# Patient Record
Sex: MALE | Race: WHITE | NOT HISPANIC OR LATINO | Employment: FULL TIME | URBAN - METROPOLITAN AREA
[De-identification: names, ages, dates, MRNs, and addresses within clinical notes are randomized per-mention and may not be internally consistent; named-entity substitution may affect disease eponyms.]

---

## 2019-06-13 ENCOUNTER — WORRISOME GROWTH - SEE NOTE (OUTPATIENT)
Dept: URBAN - METROPOLITAN AREA CLINIC 31 | Facility: CLINIC | Age: 54
Setting detail: DERMATOLOGY
End: 2019-06-13

## 2019-06-13 DIAGNOSIS — D22.5 MELANOCYTIC NEVI OF TRUNK: ICD-10-CM

## 2019-06-13 DIAGNOSIS — L73.2 HIDRADENITIS SUPPURATIVA: ICD-10-CM

## 2019-06-13 DIAGNOSIS — S40.261A INSECT BITE (NONVENOMOUS) OF RIGHT SHOULDER, INITIAL ENCOUNTER: ICD-10-CM

## 2019-06-13 DIAGNOSIS — L56.8 OTHER SPECIFIED ACUTE SKIN CHANGES DUE TO ULTRAVIOLET RADIATION: ICD-10-CM

## 2019-06-13 PROBLEM — L57.0 ACTINIC KERATOSIS: Status: RESOLVED | Noted: 2019-06-13

## 2019-06-13 PROCEDURE — 17000 DESTRUCT PREMALG LESION: CPT

## 2020-02-05 ENCOUNTER — OFFICE VISIT (OUTPATIENT)
Dept: FAMILY MEDICINE CLINIC | Facility: CLINIC | Age: 55
End: 2020-02-05

## 2020-02-05 VITALS
OXYGEN SATURATION: 98 % | WEIGHT: 200 LBS | BODY MASS INDEX: 27.09 KG/M2 | TEMPERATURE: 97.7 F | HEIGHT: 72 IN | HEART RATE: 67 BPM | SYSTOLIC BLOOD PRESSURE: 118 MMHG | DIASTOLIC BLOOD PRESSURE: 78 MMHG

## 2020-02-05 DIAGNOSIS — R04.0 EPISTAXIS: Primary | ICD-10-CM

## 2020-02-05 PROCEDURE — 99213 OFFICE O/P EST LOW 20 MIN: CPT | Performed by: FAMILY MEDICINE

## 2020-02-05 NOTE — PROGRESS NOTES
Subjective   Benjamin Enciso is a 54 y.o. male.     Chief Complaint   Patient presents with   • Nose Bleed     1x time only, today       Patient is known to me.  He was seen at my former office.  He is here today for a new problem.  He had a nosebleed today out of the blue.  He stated that he thought his nose was running and he looked down and it was bleeding.  It was coming only from the right nostril.  There was no pain in the nasal area and the patient denies any recent upper respiratory infection or headaches.  He did have a blood pressure check at work about 10 days ago and it was about 120/80.  Otherwise in general the patient feels that his health has been good lately.         Review of Systems   Constitutional: Negative for activity change, chills, fatigue and fever.   HENT: Negative for hearing loss, swollen glands, tinnitus and trouble swallowing.    Eyes: Negative for pain and visual disturbance.   Respiratory: Negative for cough and shortness of breath.    Cardiovascular: Negative for chest pain, palpitations and leg swelling.   Gastrointestinal: Negative for diarrhea and nausea.   Endocrine: Negative for polydipsia and polyuria.   Genitourinary: Negative for difficulty urinating and urinary incontinence.   Musculoskeletal: Negative for arthralgias, gait problem and joint swelling.   Skin: Negative for rash.   Allergic/Immunologic: Negative for immunocompromised state.   Neurological: Negative for dizziness, light-headedness and headache.   Hematological: Negative for adenopathy. Does not bruise/bleed easily.   Psychiatric/Behavioral: Negative for dysphoric mood and sleep disturbance.       The following portions of the patient's history were reviewed and updated as appropriate: allergies, current medications, past family history, past medical history, past social history, past surgical history and problem list.    History reviewed. No pertinent past medical history.    History reviewed. No pertinent  "surgical history.    Family History   Problem Relation Age of Onset   • Cancer Mother    • No Known Problems Father        Social History     Socioeconomic History   • Marital status:      Spouse name: Not on file   • Number of children: Not on file   • Years of education: Not on file   • Highest education level: Not on file   Tobacco Use   • Smoking status: Never Smoker   • Smokeless tobacco: Never Used   Substance and Sexual Activity   • Alcohol use: Yes     Comment: rarely   • Drug use: Never       No current outpatient medications on file.    Objective     Vitals:    02/05/20 1600   BP: 118/78   Pulse: 67   Temp: 97.7 °F (36.5 °C)   SpO2: 98%   Weight: 90.7 kg (200 lb)   Height: 182.9 cm (72\")       Body mass index is 27.12 kg/m².    No components found for: 2D    Physical Exam   Constitutional: He is oriented to person, place, and time. He appears well-developed and well-nourished.   HENT:   Head: Normocephalic and atraumatic.   Eyes: Conjunctivae are normal.   Neck: Normal range of motion. Neck supple.   Cardiovascular: Normal rate, regular rhythm, normal heart sounds and intact distal pulses.   Pulmonary/Chest: Effort normal and breath sounds normal.   Abdominal: Soft. Bowel sounds are normal.   Musculoskeletal: Normal range of motion. He exhibits no edema.   Neurological: He is alert and oriented to person, place, and time.   Skin: Skin is warm and dry. Capillary refill takes less than 2 seconds. No rash noted.   Psychiatric: He has a normal mood and affect. His behavior is normal. Judgment and thought content normal.   Nursing note and vitals reviewed.      Procedures    Assessment/Plan   Benjamin was seen today for nose bleed.    Diagnoses and all orders for this visit:    Epistaxis        Patient Instructions   Blood pressure is good.  Reassurance given.    Nosebleed, Adult  A nosebleed is when blood comes out of the nose. Nosebleeds are common. Usually, they are not a sign of a serious " condition.  Nosebleeds can happen if a small blood vessel in your nose starts to bleed or if the lining of your nose (mucous membrane) cracks. They are commonly caused by:  · Allergies.  · Colds.  · Picking your nose.  · Blowing your nose too hard.  · An injury from sticking an object into your nose or getting hit in the nose.  · Dry or cold air.  Less common causes of nosebleeds include:  · Toxic fumes.  · Something abnormal in the nose or in the air-filled spaces in the bones of the face (sinuses).  · Growths in the nose, such as polyps.  · Medicines or conditions that cause blood to clot slowly.  · Certain illnesses or procedures that irritate or dry out the nasal passages.  Follow these instructions at home:  When you have a nosebleed:    · Sit down and tilt your head slightly forward.  · Use a clean towel or tissue to pinch your nostrils under the bony part of your nose. After 10 minutes, let go of your nose and see if bleeding starts again. Do not release pressure before that time. If there is still bleeding, repeat the pinching and holding for 10 minutes until the bleeding stops.  · Do not place tissues or gauze in the nose to stop bleeding.  · Avoid lying down and avoid tilting your head backward. That may make blood collect in the throat and cause gagging or coughing.  · Use a nasal spray decongestant to help with a nosebleed as told by your health care provider.  After a nosebleed:  · Avoid blowing your nose or sniffing for a number of hours.  · Avoid straining, lifting, or bending at the waist for several days. You may resume other normal activities as you are able.  · Use saline spray or a humidifier as told by your health care provider.  · Aspirin and blood thinners make bleeding more likely. If you are prescribed these medicines and you suffer from nosebleeds:  ? Ask your health care provider if you should stop taking the medicines or if you should adjust the dose.  ? Do not stop taking medicines that  your health care provider has recommended unless told by your health care provider.  · If your nosebleed was caused by dry mucous membranes, use over-the-counter saline nasal spray or gel. This will keep the mucous membranes moist and allow them to heal. If you must use a lubricant:  ? Choose one that is water-soluble.  ? Use only as much as you need and use it only as often as needed.  ? Do not lie down until several hours after you use it.  Contact a health care provider if:  · You have a fever.  · You get nosebleeds often or more often than usual.  · You bruise very easily.  · You have a nosebleed from having something stuck in your nose.  · You have bleeding in your mouth.  · You vomit or cough up brown material.  · You have a nosebleed after you start a new medicine.  Get help right away if:  · You have a nosebleed after a fall or a head injury.  · Your nosebleed does not go away after 20 minutes.  · You feel dizzy or weak.  · You have unusual bleeding from other parts of your body.  · You have unusual bruising on other parts of your body.  · You become sweaty.  · You vomit blood.  This information is not intended to replace advice given to you by your health care provider. Make sure you discuss any questions you have with your health care provider.  Document Released: 09/27/2006 Document Revised: 04/24/2018 Document Reviewed: 07/04/2017  [employer] Interactive Patient Education © 2019 [employer]

## 2020-02-05 NOTE — PATIENT INSTRUCTIONS
Blood pressure is good.  Reassurance given.    Nosebleed, Adult  A nosebleed is when blood comes out of the nose. Nosebleeds are common. Usually, they are not a sign of a serious condition.  Nosebleeds can happen if a small blood vessel in your nose starts to bleed or if the lining of your nose (mucous membrane) cracks. They are commonly caused by:  · Allergies.  · Colds.  · Picking your nose.  · Blowing your nose too hard.  · An injury from sticking an object into your nose or getting hit in the nose.  · Dry or cold air.  Less common causes of nosebleeds include:  · Toxic fumes.  · Something abnormal in the nose or in the air-filled spaces in the bones of the face (sinuses).  · Growths in the nose, such as polyps.  · Medicines or conditions that cause blood to clot slowly.  · Certain illnesses or procedures that irritate or dry out the nasal passages.  Follow these instructions at home:  When you have a nosebleed:    · Sit down and tilt your head slightly forward.  · Use a clean towel or tissue to pinch your nostrils under the bony part of your nose. After 10 minutes, let go of your nose and see if bleeding starts again. Do not release pressure before that time. If there is still bleeding, repeat the pinching and holding for 10 minutes until the bleeding stops.  · Do not place tissues or gauze in the nose to stop bleeding.  · Avoid lying down and avoid tilting your head backward. That may make blood collect in the throat and cause gagging or coughing.  · Use a nasal spray decongestant to help with a nosebleed as told by your health care provider.  After a nosebleed:  · Avoid blowing your nose or sniffing for a number of hours.  · Avoid straining, lifting, or bending at the waist for several days. You may resume other normal activities as you are able.  · Use saline spray or a humidifier as told by your health care provider.  · Aspirin and blood thinners make bleeding more likely. If you are prescribed these  medicines and you suffer from nosebleeds:  ? Ask your health care provider if you should stop taking the medicines or if you should adjust the dose.  ? Do not stop taking medicines that your health care provider has recommended unless told by your health care provider.  · If your nosebleed was caused by dry mucous membranes, use over-the-counter saline nasal spray or gel. This will keep the mucous membranes moist and allow them to heal. If you must use a lubricant:  ? Choose one that is water-soluble.  ? Use only as much as you need and use it only as often as needed.  ? Do not lie down until several hours after you use it.  Contact a health care provider if:  · You have a fever.  · You get nosebleeds often or more often than usual.  · You bruise very easily.  · You have a nosebleed from having something stuck in your nose.  · You have bleeding in your mouth.  · You vomit or cough up brown material.  · You have a nosebleed after you start a new medicine.  Get help right away if:  · You have a nosebleed after a fall or a head injury.  · Your nosebleed does not go away after 20 minutes.  · You feel dizzy or weak.  · You have unusual bleeding from other parts of your body.  · You have unusual bruising on other parts of your body.  · You become sweaty.  · You vomit blood.  This information is not intended to replace advice given to you by your health care provider. Make sure you discuss any questions you have with your health care provider.  Document Released: 09/27/2006 Document Revised: 04/24/2018 Document Reviewed: 07/04/2017  [employer] Interactive Patient Education © 2019 [employer]

## 2020-03-17 ENCOUNTER — OFFICE VISIT (OUTPATIENT)
Dept: FAMILY MEDICINE CLINIC | Facility: CLINIC | Age: 55
End: 2020-03-17

## 2020-03-17 VITALS
TEMPERATURE: 98.1 F | WEIGHT: 196 LBS | HEIGHT: 72 IN | HEART RATE: 67 BPM | OXYGEN SATURATION: 98 % | SYSTOLIC BLOOD PRESSURE: 130 MMHG | DIASTOLIC BLOOD PRESSURE: 78 MMHG | BODY MASS INDEX: 26.55 KG/M2

## 2020-03-17 DIAGNOSIS — Z13.220 SCREENING CHOLESTEROL LEVEL: ICD-10-CM

## 2020-03-17 DIAGNOSIS — Z00.00 WELLNESS EXAMINATION: Primary | ICD-10-CM

## 2020-03-17 PROCEDURE — 99396 PREV VISIT EST AGE 40-64: CPT | Performed by: FAMILY MEDICINE

## 2020-03-17 NOTE — PROGRESS NOTES
Subjective   Benjamin Enciso is a 54 y.o. male who presents for annual male wellness exam.  Chief Complaint   Patient presents with   • Annual Exam        Sexual History: Monogamous female partner for 26 years  Contraception: Vasectomy  Diet: Healthy well-balanced  Exercise: Routine exercise daily  Do you feel safe?  Yes  Have you ever been abused?  No  Last dental exam: 2 weeks ago  Eye exam: Last year    Colon Cancer Screenin  PSA: may have had screening in the past and it was normal if so.  No LUTS        There is no immunization history on file for this patient.    The following portions of the patient's history were reviewed and updated as appropriate: allergies, current medications, past family history, past medical history, past social history, past surgical history and problem list.    Past Medical History:   Diagnosis Date   • Acute upper respiratory infection    • Cough    • Encounter for screening for malignant neoplasm of colon     colonoscopy planned-Abstracted from [facility]   • Flu vaccine need    • Influenza    • Screening cholesterol level    • Screening for diabetes mellitus    • Tinea pedis of right foot        Past Surgical History:   Procedure Laterality Date   • VASECTOMY         Family History   Problem Relation Age of Onset   • Cancer Mother    • No Known Problems Father    • Cancer Other         cancer of the peritoneum       Social History     Socioeconomic History   • Marital status:      Spouse name: Not on file   • Number of children: Not on file   • Years of education: Not on file   • Highest education level: Not on file   Tobacco Use   • Smoking status: Never Smoker   • Smokeless tobacco: Never Used   Substance and Sexual Activity   • Alcohol use: Yes     Comment: rarely-7 or less drinks per week   • Drug use: Never       No current outpatient medications on file.    Review of Systems   Constitutional: Negative for activity change, appetite change, fatigue and unexpected  weight change.   Respiratory: Negative for cough, shortness of breath and wheezing.    Cardiovascular: Negative for chest pain, palpitations and leg swelling.   Gastrointestinal: Negative for abdominal pain, blood in stool and nausea.   Genitourinary: Negative for dysuria, frequency and urgency.   Musculoskeletal: Negative for arthralgias, joint swelling and myalgias.   Allergic/Immunologic: Negative for environmental allergies, food allergies and immunocompromised state.   Neurological: Negative for dizziness, weakness and headaches.   Hematological: Negative for adenopathy. Does not bruise/bleed easily.   Psychiatric/Behavioral: Negative for confusion, dysphoric mood and sleep disturbance. The patient is not nervous/anxious.        Objective   Vitals:    03/17/20 1500   BP: 130/78   Pulse: 67   Temp: 98.1 °F (36.7 °C)   SpO2: 98%     Body mass index is 26.58 kg/m².  Physical Exam   Constitutional: He is oriented to person, place, and time. He appears well-developed and well-nourished.   HENT:   Head: Normocephalic and atraumatic.   Eyes: Conjunctivae are normal.   Neck: Normal range of motion. Neck supple.   Cardiovascular: Normal rate, regular rhythm, normal heart sounds and intact distal pulses.   Pulmonary/Chest: Effort normal and breath sounds normal.   Abdominal: Soft. Bowel sounds are normal.   Musculoskeletal: Normal range of motion. He exhibits no edema.   Neurological: He is alert and oriented to person, place, and time.   Skin: Skin is warm and dry. Capillary refill takes less than 2 seconds. No rash noted.   Psychiatric: He has a normal mood and affect. His behavior is normal. Judgment and thought content normal.   Nursing note and vitals reviewed.        Assessment/Plan   Benjamin was seen today for annual exam.    Diagnoses and all orders for this visit:    Wellness examination    Screening cholesterol level  -     Lipid Panel    Discussed the importance of maintaining a healthy weight and getting  regular exercise.  Educated patient on the benefits of healthy diet.  Advise follow-up annually for wellness exams.  Further prostate cancer screening is not indicated in this asymptomatic patient.    There are no Patient Instructions on file for this visit.

## 2020-03-18 LAB
CHOLEST SERPL-MCNC: 250 MG/DL (ref 100–199)
HDLC SERPL-MCNC: 57 MG/DL
LDLC SERPL CALC-MCNC: 168 MG/DL (ref 0–99)
TRIGL SERPL-MCNC: 124 MG/DL (ref 0–149)
VLDLC SERPL CALC-MCNC: 25 MG/DL (ref 5–40)

## 2021-03-30 ENCOUNTER — BULK ORDERING (OUTPATIENT)
Dept: CASE MANAGEMENT | Facility: OTHER | Age: 56
End: 2021-03-30

## 2021-03-30 DIAGNOSIS — Z23 IMMUNIZATION DUE: ICD-10-CM

## 2021-07-12 ENCOUNTER — RX ONLY (RX ONLY)
Age: 56
End: 2021-07-12

## 2021-07-12 ENCOUNTER — RASH (OUTPATIENT)
Dept: URBAN - METROPOLITAN AREA CLINIC 31 | Facility: CLINIC | Age: 56
Setting detail: DERMATOLOGY
End: 2021-07-12

## 2021-07-12 DIAGNOSIS — B07.8 OTHER VIRAL WARTS: ICD-10-CM

## 2021-07-12 PROBLEM — L82.0 INFLAMED SEBORRHEIC KERATOSIS: Status: RESOLVED | Noted: 2021-07-12

## 2021-07-12 PROBLEM — L57.0 ACTINIC KERATOSIS: Status: RESOLVED | Noted: 2021-07-12

## 2021-07-12 PROBLEM — R21 RASH AND OTHER NONSPECIFIC SKIN ERUPTION: Status: RESOLVED | Noted: 2021-07-12

## 2021-07-12 PROBLEM — R21 RASH AND OTHER NONSPECIFIC SKIN ERUPTION: Status: ACTIVE | Noted: 2021-07-12

## 2021-07-12 PROBLEM — L82.0 INFLAMED SEBORRHEIC KERATOSIS: Status: ACTIVE | Noted: 2021-07-12

## 2021-07-12 PROCEDURE — 17003 DESTRUCT PREMALG LES 2-14: CPT

## 2021-07-12 PROCEDURE — 17110 DESTRUCT B9 LESION 1-14: CPT

## 2021-07-12 PROCEDURE — 17000 DESTRUCT PREMALG LESION: CPT

## 2021-07-12 PROCEDURE — 99213 OFFICE O/P EST LOW 20 MIN: CPT

## 2021-07-12 RX ORDER — TRETINOIN 0.25 MG/G
CREAM TOPICAL
Qty: 30 | Refills: 4
Start: 2021-07-12

## 2021-08-17 ENCOUNTER — OFFICE VISIT (OUTPATIENT)
Dept: FAMILY MEDICINE CLINIC | Facility: CLINIC | Age: 56
End: 2021-08-17

## 2021-08-17 VITALS
OXYGEN SATURATION: 97 % | DIASTOLIC BLOOD PRESSURE: 66 MMHG | TEMPERATURE: 98.2 F | HEIGHT: 72 IN | WEIGHT: 199 LBS | SYSTOLIC BLOOD PRESSURE: 130 MMHG | HEART RATE: 64 BPM | BODY MASS INDEX: 26.95 KG/M2

## 2021-08-17 DIAGNOSIS — S42.112A CLOSED DISPLACED FRACTURE OF BODY OF LEFT SCAPULA, INITIAL ENCOUNTER: Primary | ICD-10-CM

## 2021-08-17 DIAGNOSIS — S22.009A CLOSED FRACTURE OF TRANSVERSE PROCESS OF THORACIC VERTEBRA, INITIAL ENCOUNTER (HCC): ICD-10-CM
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
## 2021-08-17 PROCEDURE — 99215 OFFICE O/P EST HI 40 MIN: CPT | Performed by: FAMILY MEDICINE

## 2021-08-17 RX ORDER — HYDROCODONE BITARTRATE AND ACETAMINOPHEN 5; 325 MG/1; MG/1
2 TABLET ORAL EVERY 8 HOURS PRN
Qty: 30 TABLET | Refills: 0 | Status: SHIPPED | OUTPATIENT
Start: 2021-08-17 | End: 2021-11-02
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
## 2021-09-08 ENCOUNTER — TELEPHONE (OUTPATIENT)
Dept: FAMILY MEDICINE CLINIC | Facility: CLINIC | Age: 56
End: 2021-09-08

## 2021-09-08 DIAGNOSIS — S42.112A CLOSED DISPLACED FRACTURE OF BODY OF LEFT SCAPULA, INITIAL ENCOUNTER: Primary | ICD-10-CM

## 2021-09-08 RX ORDER — CYCLOBENZAPRINE HCL 5 MG
5-10 TABLET ORAL 2 TIMES DAILY PRN
Qty: 60 TABLET | Refills: 0 | Status: SHIPPED | OUTPATIENT
Start: 2021-09-08 | End: 2021-11-02 | Stop reason: SDUPTHER

## 2021-09-08 NOTE — TELEPHONE ENCOUNTER
Caller: Benjamin Enciso    Relationship: Self    Best call back number: [phone]    What medication are you requesting: MUSCLE RELAXER THAT DR REAL HAD OFFERED AT INITIAL APPT. THAT PATIENT HAD ORIGINALLY REFUSED.     What are your current symptoms: NECK AND LOWER BACK PAIN.       Have you had these symptoms before:    [x] Yes  [] No    Have you been treated for these symptoms before:   [x] Yes  [] No    If a prescription is needed, what is your preferred pharmacy and phone number: [facility] #[id number] - [place] [address] - [phone]  - [phone]     Additional notes: PLEASE CALL PATIENT WITH ANY QUESTIONS.

## 2021-09-16 ENCOUNTER — SKIN CANCER EXAM (OUTPATIENT)
Dept: URBAN - METROPOLITAN AREA CLINIC 31 | Facility: CLINIC | Age: 56
Setting detail: DERMATOLOGY
End: 2021-09-16

## 2021-09-16 DIAGNOSIS — L70.0 ACNE VULGARIS: ICD-10-CM

## 2021-09-16 DIAGNOSIS — L70.1 ACNE CONGLOBATA: ICD-10-CM

## 2021-09-16 PROBLEM — L57.0 ACTINIC KERATOSIS: Status: RESOLVED | Noted: 2021-09-16

## 2021-09-16 PROBLEM — D18.01 HEMANGIOMA OF SKIN AND SUBCUTANEOUS TISSUE: Status: RESOLVED | Noted: 2021-09-16

## 2021-09-16 PROBLEM — D18.01 HEMANGIOMA OF SKIN AND SUBCUTANEOUS TISSUE: Status: ACTIVE | Noted: 2021-09-16

## 2021-09-16 PROCEDURE — 17000 DESTRUCT PREMALG LESION: CPT

## 2021-09-16 PROCEDURE — 11102 TANGNTL BX SKIN SINGLE LES: CPT

## 2021-09-16 PROCEDURE — 99213 OFFICE O/P EST LOW 20 MIN: CPT

## 2021-09-16 PROCEDURE — 17003 DESTRUCT PREMALG LES 2-14: CPT

## 2021-09-16 PROCEDURE — 11104 PUNCH BX SKIN SINGLE LESION: CPT

## 2021-09-21 ENCOUNTER — TELEPHONE (OUTPATIENT)
Dept: FAMILY MEDICINE CLINIC | Facility: CLINIC | Age: 56
End: 2021-09-21
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
## 2021-11-02 ENCOUNTER — OFFICE VISIT (OUTPATIENT)
Dept: FAMILY MEDICINE CLINIC | Facility: CLINIC | Age: 56
End: 2021-11-02

## 2021-11-02 VITALS
HEIGHT: 72 IN | OXYGEN SATURATION: 98 % | HEART RATE: 67 BPM | DIASTOLIC BLOOD PRESSURE: 84 MMHG | BODY MASS INDEX: 26.66 KG/M2 | SYSTOLIC BLOOD PRESSURE: 122 MMHG | WEIGHT: 196.8 LBS | TEMPERATURE: 97.1 F

## 2021-11-02 DIAGNOSIS — Z12.11 SCREENING FOR MALIGNANT NEOPLASM OF COLON: ICD-10-CM

## 2021-11-02 DIAGNOSIS — Z13.220 ENCOUNTER FOR LIPID SCREENING FOR CARDIOVASCULAR DISEASE: ICD-10-CM

## 2021-11-02 DIAGNOSIS — Z00.00 WELLNESS EXAMINATION: Primary | ICD-10-CM

## 2021-11-02 DIAGNOSIS — S42.112A CLOSED DISPLACED FRACTURE OF BODY OF LEFT SCAPULA, INITIAL ENCOUNTER: ICD-10-CM

## 2021-11-02 DIAGNOSIS — Z13.1 ENCOUNTER FOR SCREENING FOR DIABETES MELLITUS: ICD-10-CM

## 2021-11-02 DIAGNOSIS — Z80.8 FAMILY HISTORY OF PERITONEAL CANCER: ICD-10-CM

## 2021-11-02 DIAGNOSIS — Z13.6 ENCOUNTER FOR LIPID SCREENING FOR CARDIOVASCULAR DISEASE: ICD-10-CM

## 2021-11-02 PROCEDURE — 99396 PREV VISIT EST AGE 40-64: CPT | Performed by: FAMILY MEDICINE

## 2021-11-02 RX ORDER — MELOXICAM 15 MG/1
15 TABLET ORAL DAILY
COMMUNITY
Start: 2021-10-12 | End: 2022-11-15 | Stop reason: SDUPTHER

## 2021-11-02 RX ORDER — CYCLOBENZAPRINE HCL 5 MG
5-10 TABLET ORAL 2 TIMES DAILY PRN
Qty: 60 TABLET | Refills: 0 | Status: SHIPPED | OUTPATIENT
Start: 2021-11-02 | End: 2022-11-15

## 2021-11-02 NOTE — PROGRESS NOTES
Subjective   Benjamin Enciso is a 56 y.o. male who presents for annual male wellness exam.  Chief Complaint   Patient presents with   • Annual Exam     Sexual History: Monogamous female partner for 27 years  Contraception: Vasectomy  Diet: Healthy well-balanced  Exercise: Routine exercise daily  Do you feel safe?  Yes  Have you ever been abused?  No  Last dental exam: 6 weeks ago  Eye exam: 18 months ago    Colon Cancer Screenin, normal   PSA: may have had screening in the past and it was normal if so.  No LUTS    Immunization History   Administered Date(s) Administered   • COVID-19 (MODERNA) 2021, 2021       The following portions of the patient's history were reviewed and updated as appropriate: allergies, current medications, past family history, past medical history, past social history, past surgical history and problem list.    Past Medical History:   Diagnosis Date   • Acute upper respiratory infection    • Cough    • Encounter for screening for malignant neoplasm of colon     colonoscopy planned-Abstracted from [facility]   • Flu vaccine need    • Influenza    • Screening cholesterol level    • Screening for diabetes mellitus    • Tinea pedis of right foot        Past Surgical History:   Procedure Laterality Date   • VASECTOMY         Family History   Problem Relation Age of Onset   • Cancer Mother    • No Known Problems Father    • Cancer Other         cancer of the peritoneum       Social History     Socioeconomic History   • Marital status:    Tobacco Use   • Smoking status: Never Smoker   • Smokeless tobacco: Never Used   Substance and Sexual Activity   • Alcohol use: Yes     Comment: rarely-7 or less drinks per week   • Drug use: Never         Current Outpatient Medications:   •  cyclobenzaprine (FLEXERIL) 5 MG tablet, Take 1-2 tablets by mouth 2 (Two) Times a Day As Needed for Muscle Spasms., Disp: 60 tablet, Rfl: 0  •  meloxicam (MOBIC) 15 MG tablet, Take 15 mg by mouth  Daily., Disp: , Rfl:     Review of Systems   Constitutional: Negative for activity change, appetite change, fatigue and unexpected weight change.   Respiratory: Negative for cough, shortness of breath and wheezing.    Cardiovascular: Negative for chest pain, palpitations and leg swelling.   Gastrointestinal: Negative for abdominal pain, blood in stool and nausea.   Genitourinary: Negative for dysuria, frequency and urgency.   Musculoskeletal: Negative for arthralgias, joint swelling and myalgias.   Allergic/Immunologic: Negative for environmental allergies, food allergies and immunocompromised state.   Neurological: Negative for dizziness, weakness and headaches.   Hematological: Negative for adenopathy. Does not bruise/bleed easily.   Psychiatric/Behavioral: Negative for confusion, dysphoric mood and sleep disturbance. The patient is not nervous/anxious.        Objective   Vitals:    11/02/21 0834   BP: 122/84   Pulse: 67   Temp: 97.1 °F (36.2 °C)   SpO2: 98%     Body mass index is 26.68 kg/m².  Physical Exam  Vitals and nursing note reviewed.   Constitutional:       Appearance: Normal appearance. He is well-developed.   HENT:      Head: Normocephalic and atraumatic.   Eyes:      General: No scleral icterus.     Conjunctiva/sclera: Conjunctivae normal.   Cardiovascular:      Rate and Rhythm: Normal rate and regular rhythm.      Heart sounds: Normal heart sounds.   Pulmonary:      Effort: Pulmonary effort is normal.      Breath sounds: Normal breath sounds.   Abdominal:      General: Bowel sounds are normal.      Palpations: Abdomen is soft.   Musculoskeletal:         General: Normal range of motion.      Cervical back: Normal range of motion and neck supple.   Skin:     General: Skin is warm and dry.      Capillary Refill: Capillary refill takes less than 2 seconds.      Findings: No rash.   Neurological:      Mental Status: He is alert and oriented to person, place, and time.   Psychiatric:         Mood and  Affect: Mood normal.         Behavior: Behavior normal.         Thought Content: Thought content normal.         Judgment: Judgment normal.         Assessment/Plan   Diagnoses and all orders for this visit:    1. Wellness examination (Primary)    2. Closed displaced fracture of body of left scapula, initial encounter  -     cyclobenzaprine (FLEXERIL) 5 MG tablet; Take 1-2 tablets by mouth 2 (Two) Times a Day As Needed for Muscle Spasms.  Dispense: 60 tablet; Refill: 0    3. Screening for malignant neoplasm of colon  -     Ambulatory Referral to Gastroenterology    4. Family history of peritoneal cancer  -     Ambulatory Referral to Gastroenterology    5. Encounter for lipid screening for cardiovascular disease  -     Lipid Panel    6. Encounter for screening for diabetes mellitus  -     Comprehensive Metabolic Panel      Since the patient's mother has a history of perineal carcinoma I would like the patient seen by GI to repeat colonoscopy sooner than 10 years.  He is at 3 years  I will put in the referral.    Discussed the importance of maintaining a healthy weight and getting regular exercise.  Educated patient on the benefits of healthy diet.  Advise follow-up annually for wellness exams.    There are no Patient Instructions on file for this visit.

## 2021-11-03 LAB
ALBUMIN SERPL-MCNC: 4.8 G/DL (ref 3.8–4.9)
ALBUMIN/GLOB SERPL: 1.8 {RATIO} (ref 1.2–2.2)
ALP SERPL-CCNC: 105 IU/L (ref 44–121)
ALT SERPL-CCNC: 19 IU/L (ref 0–44)
AST SERPL-CCNC: 21 IU/L (ref 0–40)
BILIRUB SERPL-MCNC: 0.3 MG/DL (ref 0–1.2)
BUN SERPL-MCNC: 16 MG/DL (ref 6–24)
BUN/CREAT SERPL: 15 (ref 9–20)
CALCIUM SERPL-MCNC: 10 MG/DL (ref 8.7–10.2)
CHLORIDE SERPL-SCNC: 103 MMOL/L (ref 96–106)
CHOLEST SERPL-MCNC: 244 MG/DL (ref 100–199)
CO2 SERPL-SCNC: 26 MMOL/L (ref 20–29)
CREAT SERPL-MCNC: 1.04 MG/DL (ref 0.76–1.27)
GLOBULIN SER CALC-MCNC: 2.7 G/DL (ref 1.5–4.5)
GLUCOSE SERPL-MCNC: 101 MG/DL (ref 65–99)
HDLC SERPL-MCNC: 58 MG/DL
LDLC SERPL CALC-MCNC: 161 MG/DL (ref 0–99)
POTASSIUM SERPL-SCNC: 5.1 MMOL/L (ref 3.5–5.2)
PROT SERPL-MCNC: 7.5 G/DL (ref 6–8.5)
SODIUM SERPL-SCNC: 142 MMOL/L (ref 134–144)
TRIGL SERPL-MCNC: 141 MG/DL (ref 0–149)
VLDLC SERPL CALC-MCNC: 25 MG/DL (ref 5–40)

## 2021-11-29 ENCOUNTER — PREP FOR SURGERY (OUTPATIENT)
Dept: SURGERY | Facility: SURGERY CENTER | Age: 56
End: 2021-11-29

## 2021-11-29 DIAGNOSIS — Z80.0 FAMILY HISTORY OF COLON CANCER: ICD-10-CM

## 2021-11-29 DIAGNOSIS — Z12.11 ENCOUNTER FOR SCREENING COLONOSCOPY: Primary | ICD-10-CM

## 2021-11-30 PROBLEM — Z80.0 FAMILY HISTORY OF COLON CANCER: Status: ACTIVE | Noted: 2021-11-30

## 2021-11-30 PROBLEM — Z12.11 ENCOUNTER FOR SCREENING COLONOSCOPY: Status: ACTIVE | Noted: 2021-11-30

## 2021-11-30 RX ORDER — SODIUM CHLORIDE, SODIUM LACTATE, POTASSIUM CHLORIDE, CALCIUM CHLORIDE 600; 310; 30; 20 MG/100ML; MG/100ML; MG/100ML; MG/100ML
30 INJECTION, SOLUTION INTRAVENOUS CONTINUOUS PRN
Status: CANCELLED | OUTPATIENT
Start: 2021-11-30

## 2021-11-30 RX ORDER — SODIUM CHLORIDE 0.9 % (FLUSH) 0.9 %
10 SYRINGE (ML) INJECTION AS NEEDED
Status: CANCELLED | OUTPATIENT
Start: 2021-11-30

## 2021-11-30 RX ORDER — SODIUM CHLORIDE 0.9 % (FLUSH) 0.9 %
3 SYRINGE (ML) INJECTION EVERY 12 HOURS SCHEDULED
Status: CANCELLED | OUTPATIENT
Start: 2021-11-30

## 2022-04-25 ENCOUNTER — HOSPITAL ENCOUNTER (OUTPATIENT)
Facility: SURGERY CENTER | Age: 57
Setting detail: HOSPITAL OUTPATIENT SURGERY
Discharge: HOME | End: 2022-04-25
Attending: INTERNAL MEDICINE | Admitting: INTERNAL MEDICINE

## 2022-04-25 ENCOUNTER — ANESTHESIA (OUTPATIENT)
Dept: SURGERY | Facility: SURGERY CENTER | Age: 57
End: 2022-04-25

## 2022-04-25 ENCOUNTER — ANESTHESIA EVENT (OUTPATIENT)
Dept: SURGERY | Facility: SURGERY CENTER | Age: 57
End: 2022-04-25

## 2022-04-25 VITALS
RESPIRATION RATE: 16 BRPM | HEIGHT: 72 IN | WEIGHT: 198 LBS | SYSTOLIC BLOOD PRESSURE: 113 MMHG | TEMPERATURE: 98 F | DIASTOLIC BLOOD PRESSURE: 70 MMHG | BODY MASS INDEX: 26.82 KG/M2 | OXYGEN SATURATION: 99 % | HEART RATE: 73 BPM

## 2022-04-25 DIAGNOSIS — Z12.11 ENCOUNTER FOR SCREENING COLONOSCOPY: ICD-10-CM

## 2022-04-25 DIAGNOSIS — Z80.0 FAMILY HISTORY OF COLON CANCER: ICD-10-CM

## 2022-04-25 PROCEDURE — 45378 DIAGNOSTIC COLONOSCOPY: CPT | Performed by: INTERNAL MEDICINE

## 2022-04-25 PROCEDURE — 25010000002 PROPOFOL 10 MG/ML EMULSION: Performed by: ANESTHESIOLOGY

## 2022-04-25 RX ORDER — PROPOFOL 10 MG/ML
VIAL (ML) INTRAVENOUS AS NEEDED
Status: DISCONTINUED | OUTPATIENT
Start: 2022-04-25 | End: 2022-04-25 | Stop reason: SURG

## 2022-04-25 RX ORDER — SODIUM CHLORIDE 0.9 % (FLUSH) 0.9 %
10 SYRINGE (ML) INJECTION AS NEEDED
Status: DISCONTINUED | OUTPATIENT
Start: 2022-04-25 | End: 2022-04-25 | Stop reason: HOSPADM

## 2022-04-25 RX ORDER — MAGNESIUM HYDROXIDE 1200 MG/15ML
LIQUID ORAL AS NEEDED
Status: DISCONTINUED | OUTPATIENT
Start: 2022-04-25 | End: 2022-04-25 | Stop reason: HOSPADM

## 2022-04-25 RX ORDER — LIDOCAINE HYDROCHLORIDE 20 MG/ML
INJECTION, SOLUTION INFILTRATION; PERINEURAL AS NEEDED
Status: DISCONTINUED | OUTPATIENT
Start: 2022-04-25 | End: 2022-04-25 | Stop reason: SURG

## 2022-04-25 RX ORDER — SODIUM CHLORIDE 0.9 % (FLUSH) 0.9 %
3 SYRINGE (ML) INJECTION EVERY 12 HOURS SCHEDULED
Status: DISCONTINUED | OUTPATIENT
Start: 2022-04-25 | End: 2022-04-25 | Stop reason: HOSPADM

## 2022-04-25 RX ORDER — SODIUM CHLORIDE, SODIUM LACTATE, POTASSIUM CHLORIDE, CALCIUM CHLORIDE 600; 310; 30; 20 MG/100ML; MG/100ML; MG/100ML; MG/100ML
30 INJECTION, SOLUTION INTRAVENOUS CONTINUOUS PRN
Status: DISCONTINUED | OUTPATIENT
Start: 2022-04-25 | End: 2022-04-25 | Stop reason: HOSPADM

## 2022-04-25 RX ADMIN — SODIUM CHLORIDE, POTASSIUM CHLORIDE, SODIUM LACTATE AND CALCIUM CHLORIDE 30 ML/HR: 600; 310; 30; 20 INJECTION, SOLUTION INTRAVENOUS at 07:12

## 2022-04-25 RX ADMIN — PROPOFOL 200 MCG/KG/MIN: 10 INJECTION, EMULSION INTRAVENOUS at 07:28

## 2022-04-25 RX ADMIN — LIDOCAINE HYDROCHLORIDE 60 MG: 20 INJECTION, SOLUTION INFILTRATION; PERINEURAL at 07:28

## 2022-04-25 RX ADMIN — PROPOFOL 90 MG: 10 INJECTION, EMULSION INTRAVENOUS at 07:28

## 2022-04-25 NOTE — ANESTHESIA POSTPROCEDURE EVALUATION
"Patient: Benjamin Enciso    Procedure Summary     Date: 04/25/22 Room / Location: [facility]    Anesthesia Start: 0723 Anesthesia Stop: 0749    Procedure: COLONOSCOPY (N/A ) Diagnosis:       Encounter for screening colonoscopy      Family history of colon cancer      (Encounter for screening colonoscopy [Z12.11])      (Family history of colon cancer [Z80.0])    Surgeons: Eyal Bajwa MD Provider: Ang Tejada MD    Anesthesia Type: MAC ASA Status: 1          Anesthesia Type: MAC    Vitals  Vitals Value Taken Time   /70 04/25/22 0805   Temp 36.7 °C (98 °F) 04/25/22 0751   Pulse 73 04/25/22 0805   Resp 16 04/25/22 0805   SpO2 99 % 04/25/22 0805           Post Anesthesia Care and Evaluation    Patient location during evaluation: bedside  Patient participation: complete - patient participated  Level of consciousness: awake and alert  Pain management: adequate  Airway patency: patent  Anesthetic complications: No anesthetic complications    Cardiovascular status: acceptable  Respiratory status: acceptable  Hydration status: acceptable    Comments: /70 (BP Location: Left arm, Patient Position: Sitting)   Pulse 73   Temp 36.7 °C (98 °F) (Infrared)   Resp 16   Ht 182.9 cm (72\")   Wt 89.8 kg (198 lb)   SpO2 99%   BMI 26.85 kg/m²           "

## 2022-04-25 NOTE — H&P
No chief complaint on file.      HPI  screening       Problem List:    Patient Active Problem List   Diagnosis   • Encounter for screening colonoscopy   • Family history of colon cancer       Medical History:    Past Medical History:   Diagnosis Date   • Acute upper respiratory infection    • Cough    • Encounter for screening for malignant neoplasm of colon     colonoscopy planned-Abstracted from [facility]   • Flu vaccine need    • Influenza    • Screening cholesterol level    • Screening for diabetes mellitus    • Tinea pedis of right foot         Social History:    Social History     Socioeconomic History   • Marital status:    Tobacco Use   • Smoking status: Never Smoker   • Smokeless tobacco: Never Used   Substance and Sexual Activity   • Alcohol use: Yes     Comment: rarely-7 or less drinks per week   • Drug use: Never       Family History:   Family History   Problem Relation Age of Onset   • Cancer Mother    • No Known Problems Father    • Cancer Other         cancer of the peritoneum       Surgical History:   Past Surgical History:   Procedure Laterality Date   • VASECTOMY           Current Facility-Administered Medications:   •  lactated ringers infusion, 30 mL/hr, Intravenous, Continuous PRN, Eyal Bajwa MD, Last Rate: 30 mL/hr at 04/25/22 0715, Currently Infusing at 04/25/22 0715  •  sodium chloride 0.9 % flush 10 mL, 10 mL, Intravenous, PRN, Eyal Bajwa MD  •  sodium chloride 0.9 % flush 3 mL, 3 mL, Intravenous, Q12H, Eyal Bajwa MD  •  sterile water irrigation solution, , , PRN, Eyal Bajwa MD, 1,000 mL at 04/25/22 0711    Allergies: No Known Allergies     The following portions of the patient's history were reviewed by me and updated as appropriate: review of systems, allergies, current medications, past family history, past medical history, past social history, past surgical history and problem list.    Vitals:    04/25/22 0708   BP: 163/95   Pulse: 62   Resp: 20    Temp: 98.4 °F (36.9 °C)   SpO2: 99%       PHYSICAL EXAM:    CONSTITUTIONAL:  today's vital signs reviewed by me  GASTROINTESTINAL: abdomen is soft nontender nondistended with normal active bowel sounds, no masses are appreciated    Assessment/ Plan  Screening  colonoscopy    Risks and benefits as well as alternatives to endoscopic evaluation were explained to the patient and they voiced understanding and wish to proceed.  These risks include but are not limited to the risk of bleeding, perforation, adverse reaction to sedation, and missed lesions.  The patient was given the opportunity to ask questions prior to the endoscopic procedure.

## 2022-04-25 NOTE — ANESTHESIA PREPROCEDURE EVALUATION
Anesthesia Evaluation     Patient summary reviewed and Nursing notes reviewed   no history of anesthetic complications:  NPO Solid Status: > 6 hours  NPO Liquid Status: > 6 hours           Airway   Mallampati: II  TM distance: >3 FB  Neck ROM: full  no difficulty expected and No difficulty expected  Dental - normal exam     Pulmonary - normal exam    breath sounds clear to auscultation  (+) recent URI,   (-) rhonchi, decreased breath sounds, wheezes, rales, stridor  Cardiovascular - negative cardio ROS and normal exam    NYHA Classification: I  Rhythm: regular  Rate: normal    (-) murmur, weak pulses, friction rub, systolic click, carotid bruits, JVD, peripheral edema      Neuro/Psych- negative ROS  GI/Hepatic/Renal/Endo - negative ROS     Musculoskeletal     (+) back pain,   Abdominal  - normal exam    Abdomen: soft.   Substance History - negative use     OB/GYN negative ob/gyn ROS         Other - negative ROS                       Anesthesia Plan    ASA 1     MAC     intravenous induction     Anesthetic plan, all risks, benefits, and alternatives have been provided, discussed and informed consent has been obtained with: patient.        CODE STATUS:

## 2022-11-15 ENCOUNTER — OFFICE VISIT (OUTPATIENT)
Dept: FAMILY MEDICINE CLINIC | Facility: CLINIC | Age: 57
End: 2022-11-15

## 2022-11-15 VITALS
HEART RATE: 57 BPM | SYSTOLIC BLOOD PRESSURE: 118 MMHG | TEMPERATURE: 98.2 F | BODY MASS INDEX: 26.79 KG/M2 | WEIGHT: 197.8 LBS | DIASTOLIC BLOOD PRESSURE: 72 MMHG | OXYGEN SATURATION: 98 % | HEIGHT: 72 IN

## 2022-11-15 DIAGNOSIS — Z13.220 ENCOUNTER FOR LIPID SCREENING FOR CARDIOVASCULAR DISEASE: ICD-10-CM

## 2022-11-15 DIAGNOSIS — Z00.00 ENCOUNTER FOR WELLNESS EXAMINATION IN ADULT: Primary | ICD-10-CM

## 2022-11-15 DIAGNOSIS — Z11.59 NEED FOR HEPATITIS C SCREENING TEST: ICD-10-CM

## 2022-11-15 DIAGNOSIS — Z13.6 ENCOUNTER FOR LIPID SCREENING FOR CARDIOVASCULAR DISEASE: ICD-10-CM

## 2022-11-15 PROCEDURE — 99396 PREV VISIT EST AGE 40-64: CPT | Performed by: FAMILY MEDICINE

## 2022-11-15 RX ORDER — MELOXICAM 15 MG/1
15 TABLET ORAL DAILY
Qty: 90 TABLET | Refills: 3 | Status: SHIPPED | OUTPATIENT
Start: 2022-11-15
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
## 2022-11-16 LAB
CHOLEST SERPL-MCNC: 220 MG/DL (ref 100–199)
HCV AB S/CO SERPL IA: 0.1 S/CO RATIO (ref 0–0.9)
HDLC SERPL-MCNC: 54 MG/DL
LDLC SERPL CALC-MCNC: 136 MG/DL (ref 0–99)
TRIGL SERPL-MCNC: 168 MG/DL (ref 0–149)
VLDLC SERPL CALC-MCNC: 30 MG/DL (ref 5–40)

## 2023-11-16 ENCOUNTER — OFFICE VISIT (OUTPATIENT)
Dept: FAMILY MEDICINE CLINIC | Facility: CLINIC | Age: 58
End: 2023-11-16
Payer: COMMERCIAL

## 2023-11-16 VITALS
HEART RATE: 50 BPM | WEIGHT: 197.8 LBS | HEIGHT: 72 IN | DIASTOLIC BLOOD PRESSURE: 78 MMHG | SYSTOLIC BLOOD PRESSURE: 123 MMHG | OXYGEN SATURATION: 100 % | BODY MASS INDEX: 26.79 KG/M2

## 2023-11-16 DIAGNOSIS — Z00.00 ENCOUNTER FOR WELLNESS EXAMINATION IN ADULT: Primary | ICD-10-CM

## 2023-11-16 DIAGNOSIS — Z12.5 SCREENING FOR MALIGNANT NEOPLASM OF PROSTATE: ICD-10-CM

## 2023-11-16 DIAGNOSIS — Z13.1 SCREENING FOR DIABETES MELLITUS: ICD-10-CM

## 2023-11-16 DIAGNOSIS — Z13.6 ENCOUNTER FOR LIPID SCREENING FOR CARDIOVASCULAR DISEASE: ICD-10-CM

## 2023-11-16 DIAGNOSIS — Z13.220 ENCOUNTER FOR LIPID SCREENING FOR CARDIOVASCULAR DISEASE: ICD-10-CM

## 2023-11-16 LAB
ALBUMIN SERPL-MCNC: 4.3 G/DL (ref 3.5–5.2)
ALBUMIN/GLOB SERPL: 2.3 G/DL
ALP SERPL-CCNC: 68 U/L (ref 39–117)
ALT SERPL-CCNC: 25 U/L (ref 1–41)
AST SERPL-CCNC: 18 U/L (ref 1–40)
BILIRUB SERPL-MCNC: 0.2 MG/DL (ref 0–1.2)
BUN SERPL-MCNC: 25 MG/DL (ref 8–23)
BUN/CREAT SERPL: 25.3 (ref 7–25)
CALCIUM SERPL-MCNC: 9.1 MG/DL (ref 8.6–10.5)
CHLORIDE SERPL-SCNC: 105 MMOL/L (ref 98–107)
CHOLEST SERPL-MCNC: 232 MG/DL (ref 0–200)
CO2 SERPL-SCNC: 25.9 MMOL/L (ref 22–29)
CREAT SERPL-MCNC: 0.99 MG/DL (ref 0.76–1.27)
EGFRCR SERPLBLD CKD-EPI 2021: 87.2 ML/MIN/1.73
GLOBULIN SER CALC-MCNC: 1.9 GM/DL
GLUCOSE SERPL-MCNC: 96 MG/DL (ref 65–99)
HDLC SERPL-MCNC: 53 MG/DL (ref 40–60)
LDLC SERPL CALC-MCNC: 159 MG/DL (ref 0–100)
POTASSIUM SERPL-SCNC: 5.1 MMOL/L (ref 3.5–5.2)
PROT SERPL-MCNC: 6.2 G/DL (ref 6–8.5)
PSA SERPL-MCNC: 0.94 NG/ML (ref 0–4)
SODIUM SERPL-SCNC: 140 MMOL/L (ref 136–145)
TRIGL SERPL-MCNC: 111 MG/DL (ref 0–150)
VLDLC SERPL CALC-MCNC: 20 MG/DL (ref 5–40)

## 2023-11-16 PROCEDURE — 99396 PREV VISIT EST AGE 40-64: CPT | Performed by: FAMILY MEDICINE
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
## 2023-12-04 RX ORDER — MELOXICAM 15 MG/1
15 TABLET ORAL DAILY
Qty: 90 TABLET | Refills: 0 | Status: SHIPPED | OUTPATIENT
Start: 2023-12-04

## 2023-12-04 NOTE — TELEPHONE ENCOUNTER
Caller: Valentin Encisomary    Relationship: Self    Best call back number: [phone]    Requested Prescriptions:   Requested Prescriptions     Pending Prescriptions Disp Refills    meloxicam (MOBIC) 15 MG tablet 90 tablet 3     Sig: Take 1 tablet by mouth Daily.      Pharmacy where request should be sent: [facility] [id number] [place], [place] - [address] [place] [address] AT  [address] & [facility] [address] - [phone]  - [phone]     Last office visit with prescribing clinician: 11/16/2023   Last telemedicine visit with prescribing clinician: Visit date not found   Next office visit with prescribing clinician: 11/18/2024     Additional details provided by patient: PT HAS 3 DAYS LEFT OF MEDICATION.     Does the patient have less than a 3 day supply:  [] Yes  [x] No    Would you like a call back once the refill request has been completed: [] Yes [x] No    If the office needs to give you a call back, can they leave a voicemail: [] Yes [] No    Slade Frye Rep   12/04/23 15:06 EST

## 2024-01-09 RX ORDER — MELOXICAM 15 MG/1
15 TABLET ORAL DAILY
Qty: 90 TABLET | Refills: 2 | Status: SHIPPED | OUTPATIENT
Start: 2024-01-09

## 2024-01-23 ENCOUNTER — PATIENT MESSAGE (OUTPATIENT)
Dept: FAMILY MEDICINE CLINIC | Facility: CLINIC | Age: 59
End: 2024-01-23
Payer: COMMERCIAL

## 2024-01-23 RX ORDER — MELOXICAM 15 MG/1
15 TABLET ORAL DAILY
Qty: 90 TABLET | Refills: 2 | Status: SHIPPED | OUTPATIENT
Start: 2024-01-23
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
## 2024-04-01 ENCOUNTER — TELEPHONE (OUTPATIENT)
Dept: ORTHOPEDIC SURGERY | Facility: CLINIC | Age: 59
End: 2024-04-01
Payer: COMMERCIAL
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
## 2024-04-05 ENCOUNTER — PATIENT ROUNDING (BHMG ONLY) (OUTPATIENT)
Dept: ORTHOPEDIC SURGERY | Facility: CLINIC | Age: 59
End: 2024-04-05
Payer: COMMERCIAL

## 2024-04-05 ENCOUNTER — OFFICE VISIT (OUTPATIENT)
Dept: ORTHOPEDIC SURGERY | Facility: CLINIC | Age: 59
End: 2024-04-05
Payer: COMMERCIAL

## 2024-04-05 VITALS — TEMPERATURE: 97.8 F | HEIGHT: 72 IN | WEIGHT: 203.2 LBS | BODY MASS INDEX: 27.52 KG/M2

## 2024-04-05 DIAGNOSIS — R52 PAIN: ICD-10-CM

## 2024-04-05 DIAGNOSIS — M41.86 OTHER FORM OF SCOLIOSIS OF LUMBAR SPINE: Primary | ICD-10-CM

## 2024-04-05 PROCEDURE — 99203 OFFICE O/P NEW LOW 30 MIN: CPT | Performed by: NURSE PRACTITIONER

## 2024-04-05 NOTE — PROGRESS NOTES
A [employer] Message has been sent to the patient for [place] [facility]    Medications: currently is not taking any medications  Added preferred pharmacy    Patient would like communication of their results via:      Cell Phone:   Telephone Information:   Mobile [phone]     Okay to leave a message containing results? Yes     Physician requesting Consult: Kimberli Serrano MD  PCP: Kimberli Serrano MD    CC: Carl is a [employer]  and presents today with life long history of tinnitus; worse in right ear; gets so bad sometimes it drowns out the TV    Have you or a family member seen Dr. Correa before?  NO  If yes, who and what for?      Past Medical History:   Diagnosis Date   • Dysphagia 01/14/2014    EGD in 2014 showed gastric erosion   • Fatigue 1/14/2014   • Pure hypercholesterolemia 1/14/2014   • Seborrheic keratosis 1/14/2014     Past Surgical History:   Procedure Laterality Date   • Esophagogastroduodenoscopy transoral flex w/bx single or mult  March 13, 2014    Dr. Manuelito Ramos. Gastric erosion. No H pylori.    • Inner ear surgery Right     1970's     Outpatient Encounter Medications as of 7/13/2021   Medication Sig Dispense Refill   • [DISCONTINUED] buPROPion (WELLBUTRIN SR) 150 MG 12 hr tablet Take 1 pill daily for 3 days, then increase to 1 pill twice daily 60 tablet 3   • [DISCONTINUED] varenicline (Chantix Starting Month Pak) 0.5 MG X 11 & 1 MG X 42 tablet Take according to package labelling. 53 tablet 0     No facility-administered encounter medications on file as of 7/13/2021.     ALLERGIES:  Bee sting    Social History:   Do you smoke? Yes, How many packs per day 1 to 1.5 packs   Do you drink alcohol? No    Family History:   Does anyone in your immediate family have:   Hearing Loss:  YES - father due to loud noise without hearing protection  Anesthesia Complications:   NO  Bleeding Disorders: NO    Have you ever had kidney dysfunction or dialysis?: No  Do you have a Latex allergy or sensitivity? No  Have you ever had (MRSA) or a serious skin infection?  NO    Systems  Review:   In the PAST 2 weeks have you experienced any of the following?  Constitutional:  Fatigue:( low energy):  YES  Psychological:  Sleep Problems:  YES  Skin:  Rashes: NO  Eyes:  Vision changes: NO  Neurological:  Headaches:NO  Ears: Hearing Loss: NO  Ringing: YES  Dizziness: NO  Respiratory: Cough:  NO  Gastrointestinal: Heartburn: NO  Musculoskeletal: Joint Pain: NO  Hematologic: Easy Bruising: NO

## 2024-05-15 ENCOUNTER — OFFICE VISIT (OUTPATIENT)
Dept: FAMILY MEDICINE CLINIC | Facility: CLINIC | Age: 59
End: 2024-05-15
Payer: COMMERCIAL

## 2024-05-15 VITALS
BODY MASS INDEX: 27.3 KG/M2 | HEART RATE: 76 BPM | OXYGEN SATURATION: 97 % | TEMPERATURE: 98.3 F | HEIGHT: 72 IN | WEIGHT: 201.6 LBS | DIASTOLIC BLOOD PRESSURE: 84 MMHG | SYSTOLIC BLOOD PRESSURE: 130 MMHG

## 2024-05-15 DIAGNOSIS — M54.16 ACUTE LEFT LUMBAR RADICULOPATHY: Primary | ICD-10-CM

## 2024-05-15 PROCEDURE — 99213 OFFICE O/P EST LOW 20 MIN: CPT | Performed by: FAMILY MEDICINE

## 2024-05-15 RX ORDER — MELOXICAM 15 MG/1
15 TABLET ORAL DAILY
Qty: 90 TABLET | Refills: 0 | Status: SHIPPED | OUTPATIENT
Start: 2024-05-15

## 2024-05-15 RX ORDER — SENNOSIDES 8.6 MG
650 CAPSULE ORAL EVERY 8 HOURS PRN
COMMUNITY

## 2024-05-15 NOTE — PROGRESS NOTES
"Chief Complaint  Hip Pain    Subjective        Benjamin Enciso presents to [facility]  History of Present Illness  Patient is here today with a new problem.  He has had intermittent back pain over the years but lately he has been having more consistent back pain.  It is caused by lifting up his left leg to get into and out of his truck.  He does not have much pain at other times.  He also notices pain shoots down the back of his left leg to about his knee briefly intermittently when the pain is at its worst.  He denies any numbness or tingling in his lower extremities.  He denies any saddle numbness or bowel or bladder incontinence.  He has also recently seen a orthopedic nurse practitioner.      Objective   Vital Signs:  /84   Pulse 76   Temp 98.3 °F (36.8 °C)   Ht 182.9 cm (72\")   Wt 91.4 kg (201 lb 9.6 oz)   SpO2 97%   BMI 27.34 kg/m²   Estimated body mass index is 27.34 kg/m² as calculated from the following:    Height as of this encounter: 182.9 cm (72\").    Weight as of this encounter: 91.4 kg (201 lb 9.6 oz).             Physical Exam  Vitals and nursing note reviewed.   Constitutional:       Appearance: Normal appearance. He is well-developed.   HENT:      Head: Normocephalic and atraumatic.   Eyes:      Conjunctiva/sclera: Conjunctivae normal.   Cardiovascular:      Pulses: Normal pulses.   Pulmonary:      Effort: Pulmonary effort is normal.   Musculoskeletal:         General: No swelling or tenderness. Normal range of motion.      Cervical back: Normal range of motion and neck supple.      Right lower leg: No edema.      Left lower leg: No edema.      Comments: Increased left-sided back pain with rightward side bending of the lumbar spine and left straight leg raising   Skin:     General: Skin is warm and dry.      Capillary Refill: Capillary refill takes less than 2 seconds.      Findings: No rash.   Neurological:      General: No focal deficit present.      " Mental Status: He is alert and oriented to person, place, and time.      Cranial Nerves: No cranial nerve deficit.      Sensory: No sensory deficit.      Motor: No weakness.      Coordination: Coordination normal.      Gait: Gait normal.      Deep Tendon Reflexes: Reflexes normal.   Psychiatric:         Mood and Affect: Mood normal.         Behavior: Behavior normal.         Thought Content: Thought content normal.         Judgment: Judgment normal.        Result Review :    The following data was reviewed by: Lashae Milan DO on 05/15/2024:  Common labs          11/16/2023    08:40   Common Labs   Glucose 96    BUN 25    Creatinine 0.99    Sodium 140    Potassium 5.1    Chloride 105    Calcium 9.1    Total Protein 6.2    Albumin 4.3    Total Bilirubin 0.2    Alkaline Phosphatase 68    AST (SGOT) 18    ALT (SGPT) 25    Total Cholesterol 232    Triglycerides 111    HDL Cholesterol 53    LDL Cholesterol  159    PSA 0.935                     Assessment and Plan     Diagnoses and all orders for this visit:    1. Acute left lumbar radiculopathy (Primary)  -     Ambulatory Referral to Physical Therapy  -     meloxicam (MOBIC) 15 MG tablet; Take 1 tablet by mouth Daily.  Dispense: 90 tablet; Refill: 0    Patient is here today for new problem of lumbar radiculopathy.  I explained the chronic nature of his symptoms.  I recommended using ice only and no heat.  He should also start in physical therapy.  Follow-up if symptoms or not improving.         Follow Up     Return if symptoms worsen or fail to improve.  Patient was given instructions and counseling regarding his condition or for health maintenance advice. Please see specific information pulled into the AVS if appropriate.         Answers submitted by the patient for this visit:  Primary Reason for Visit (Submitted on 5/15/2024)  What is the primary reason for your visit?: Other  Other (Submitted on 5/15/2024)  Please describe your symptoms.: I stopped taking  Meloxicam a couple of months ago and began experiencing pain in hip lower extremities getting in and out if truck. Also, pain in left forearm. I had something simiar in my right  forearm 15 years ago it was treated with ultrasound.  Have you had these symptoms before?: No  How long have you been having these symptoms?: Greater than 2 weeks  Please list any medications you are currently taking for this condition.: I’m taking Tylenol Arthritis.  Please describe any probable cause for these symptoms. : Unknown

## 2024-08-15 ENCOUNTER — OFFICE VISIT (OUTPATIENT)
Dept: FAMILY MEDICINE CLINIC | Facility: CLINIC | Age: 59
End: 2024-08-15
Payer: COMMERCIAL

## 2024-08-15 VITALS
WEIGHT: 200.6 LBS | DIASTOLIC BLOOD PRESSURE: 83 MMHG | OXYGEN SATURATION: 96 % | BODY MASS INDEX: 27.17 KG/M2 | HEART RATE: 59 BPM | SYSTOLIC BLOOD PRESSURE: 126 MMHG | HEIGHT: 72 IN

## 2024-08-15 DIAGNOSIS — M67.471 GANGLION OF RIGHT ANKLE: Primary | ICD-10-CM

## 2024-08-15 PROCEDURE — 99213 OFFICE O/P EST LOW 20 MIN: CPT | Performed by: FAMILY MEDICINE

## 2024-08-15 NOTE — PROGRESS NOTES
"Chief Complaint  cyst on right ankle noticed it on tuesday     Subjective        Benjamin Enciso presents to [facility]  History of Present Illness  Patient is here today with a new concern.  He started noticing a bump on his right inner ankle a few days ago.  It does not bother him at all.  No known trauma.  He is a runner.      Objective   Vital Signs:  /83   Pulse 59   Ht 182.9 cm (72\")   Wt 91 kg (200 lb 9.6 oz)   SpO2 96%   BMI 27.21 kg/m²   Estimated body mass index is 27.21 kg/m² as calculated from the following:    Height as of this encounter: 182.9 cm (72\").    Weight as of this encounter: 91 kg (200 lb 9.6 oz).            Physical Exam  Vitals and nursing note reviewed.   Constitutional:       Appearance: Normal appearance. He is well-developed.   HENT:      Head: Normocephalic and atraumatic.   Eyes:      Conjunctiva/sclera: Conjunctivae normal.   Cardiovascular:      Pulses: Normal pulses.   Pulmonary:      Effort: Pulmonary effort is normal.   Musculoskeletal:         General: Normal range of motion.      Cervical back: Normal range of motion and neck supple.      Comments: Just distal to the right medial malleolus is a 1 cm mobile, well-circumscribed, easily compressible mass that has the appearance of a ganglionic cyst.  There is no erythema, ecchymosis or induration or edema associated with it.   Skin:     General: Skin is warm and dry.      Capillary Refill: Capillary refill takes less than 2 seconds.      Findings: No rash.   Neurological:      Mental Status: He is alert and oriented to person, place, and time.   Psychiatric:         Mood and Affect: Mood normal.         Behavior: Behavior normal.         Thought Content: Thought content normal.         Judgment: Judgment normal.        Result Review :                Assessment and Plan   Diagnoses and all orders for this visit:    1. Ganglion of right ankle (Primary)    Patient here today for an acute " ganglionic cyst of the right ankle.  Since the patient is having absolutely no symptoms from this cyst I advised him that conservative treatment would be best.  He could use compression and ice to see if he can reduce the size of the cyst.  If this is still bothering him at all he can follow-up with me or podiatry if he is considering removal.         Follow Up   Return if symptoms worsen or fail to improve.  Patient was given instructions and counseling regarding his condition or for health maintenance advice. Please see specific information pulled into the AVS if appropriate.

## 2024-11-18 ENCOUNTER — OFFICE VISIT (OUTPATIENT)
Dept: FAMILY MEDICINE CLINIC | Facility: CLINIC | Age: 59
End: 2024-11-18
Payer: COMMERCIAL

## 2024-11-18 VITALS
SYSTOLIC BLOOD PRESSURE: 124 MMHG | WEIGHT: 198.8 LBS | DIASTOLIC BLOOD PRESSURE: 75 MMHG | HEIGHT: 72 IN | BODY MASS INDEX: 26.93 KG/M2 | OXYGEN SATURATION: 95 % | HEART RATE: 61 BPM

## 2024-11-18 DIAGNOSIS — Z12.5 SCREENING FOR MALIGNANT NEOPLASM OF PROSTATE: ICD-10-CM

## 2024-11-18 DIAGNOSIS — Z00.00 ENCOUNTER FOR WELLNESS EXAMINATION IN ADULT: Primary | ICD-10-CM

## 2024-11-18 DIAGNOSIS — Z13.220 ENCOUNTER FOR LIPID SCREENING FOR CARDIOVASCULAR DISEASE: ICD-10-CM

## 2024-11-18 DIAGNOSIS — Z13.6 ENCOUNTER FOR LIPID SCREENING FOR CARDIOVASCULAR DISEASE: ICD-10-CM

## 2024-11-18 PROCEDURE — 99396 PREV VISIT EST AGE 40-64: CPT | Performed by: FAMILY MEDICINE
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
## 2024-11-19 LAB
CHOLEST SERPL-MCNC: 240 MG/DL (ref 100–199)
HDLC SERPL-MCNC: 53 MG/DL
LDLC SERPL CALC-MCNC: 158 MG/DL (ref 0–99)
PSA SERPL-MCNC: 0.9 NG/ML (ref 0–4)
TRIGL SERPL-MCNC: 163 MG/DL (ref 0–149)
VLDLC SERPL CALC-MCNC: 29 MG/DL (ref 5–40)

## 2024-11-21 ENCOUNTER — OFFICE VISIT (OUTPATIENT)
Dept: FAMILY MEDICINE CLINIC | Facility: CLINIC | Age: 59
End: 2024-11-21
Payer: COMMERCIAL

## 2024-11-21 VITALS
BODY MASS INDEX: 27.71 KG/M2 | DIASTOLIC BLOOD PRESSURE: 85 MMHG | HEIGHT: 72 IN | OXYGEN SATURATION: 98 % | HEART RATE: 52 BPM | SYSTOLIC BLOOD PRESSURE: 154 MMHG | WEIGHT: 204.6 LBS

## 2024-11-21 DIAGNOSIS — E78.2 MIXED HYPERLIPIDEMIA: Primary | ICD-10-CM

## 2024-11-21 PROCEDURE — 99213 OFFICE O/P EST LOW 20 MIN: CPT | Performed by: FAMILY MEDICINE

## 2024-11-21 RX ORDER — ROSUVASTATIN CALCIUM 5 MG/1
5 TABLET, COATED ORAL DAILY
Qty: 30 TABLET | Refills: 1 | Status: SHIPPED | OUTPATIENT
Start: 2024-11-21 | End: 2024-11-21

## 2024-11-21 RX ORDER — ROSUVASTATIN CALCIUM 5 MG/1
5 TABLET, COATED ORAL NIGHTLY
Qty: 30 TABLET | Refills: 1 | Status: SHIPPED | OUTPATIENT
Start: 2024-11-21

## 2024-11-21 NOTE — PROGRESS NOTES
"Chief Complaint  Hyperlipidemia    Subjective        Benjamin Enciso presents to [facility]  History of Present Illness  Patient is here today to discuss a new diagnosis of hyperlipidemia.  On recent labs he was found to have an ASCVD risk of 10%.  He exercises regularly and tries to eat very healthy.  He is willing to try a trial of medication.  Today's concern : Cholesterol consult. Pertinent negative symptoms include no abdominal pain, no anorexia, no joint pain, no change in stool, no chest pain, no chills, no congestion, no cough, no diaphoresis, no fatigue, no headaches, no joint swelling, no myalgias, no nausea, no neck pain, no numbness, no rash, no sore throat, no swollen glands, no dysuria, no vertigo, no visual change, no vomiting and no weakness.   Treatment and/or Medications comments include: None   Hyperlipidemia  Pertinent negatives include no chest pain or myalgias.       Objective   Vital Signs:  /85   Pulse 52   Ht 182.9 cm (72\")   Wt 92.8 kg (204 lb 9.6 oz)   SpO2 98%   BMI 27.75 kg/m²   Estimated body mass index is 27.75 kg/m² as calculated from the following:    Height as of this encounter: 182.9 cm (72\").    Weight as of this encounter: 92.8 kg (204 lb 9.6 oz).            Physical Exam  Vitals and nursing note reviewed.   Constitutional:       Appearance: Normal appearance. He is well-developed.   HENT:      Head: Normocephalic and atraumatic.   Eyes:      General: No scleral icterus.     Conjunctiva/sclera: Conjunctivae normal.   Pulmonary:      Effort: Pulmonary effort is normal.   Musculoskeletal:         General: Normal range of motion.      Cervical back: Normal range of motion and neck supple.   Skin:     General: Skin is warm and dry.      Capillary Refill: Capillary refill takes less than 2 seconds.      Findings: No rash.   Neurological:      Mental Status: He is alert and oriented to person, place, and time.   Psychiatric:         Mood and " Affect: Mood normal.         Behavior: Behavior normal.         Thought Content: Thought content normal.         Judgment: Judgment normal.        Result Review :  The following data was reviewed by: Lashae Milan DO on 11/21/2024:  Common labs          11/18/2024    08:41   Common Labs   Total Cholesterol 240    Triglycerides 163    HDL Cholesterol 53    LDL Cholesterol  158    PSA 0.9      Lipid Panel          11/18/2024    08:41   Lipid Panel   Total Cholesterol 240    Triglycerides 163    HDL Cholesterol 53    VLDL Cholesterol 29    LDL Cholesterol  158                  Assessment and Plan   Diagnoses and all orders for this visit:    1. Mixed hyperlipidemia (Primary)  -     Discontinue: rosuvastatin (CRESTOR) 5 MG tablet; Take 1 tablet by mouth Daily.  Dispense: 30 tablet; Refill: 1  -     rosuvastatin (CRESTOR) 5 MG tablet; Take 1 tablet by mouth Every Night.  Dispense: 30 tablet; Refill: 1    Patient is here today with a new diagnosis of hyperlipidemia.  Trial of rosuvastatin was sent to the pharmacy.  Patient will notify provider if he has any issues or problems.  Follow-up in about 6 to 8 weeks.         Follow Up   Return in about 8 weeks (around 1/16/2025) for Fasting.  Patient was given instructions and counseling regarding his condition or for health maintenance advice. Please see specific information pulled into the AVS if appropriate.             Answers submitted by the patient for this visit:  Primary Reason for Visit (Submitted on 11/20/2024)  What is the primary reason for your visit?: Problem Not Listed

## 2024-12-27 ENCOUNTER — TELEPHONE (OUTPATIENT)
Dept: FAMILY MEDICINE CLINIC | Facility: CLINIC | Age: 59
End: 2024-12-27
Payer: COMMERCIAL

## 2024-12-27 NOTE — TELEPHONE ENCOUNTER
HUB TO RELAY  LEFT PT VOICEMAIL REGARDING RESCHEDULING THEIR APPOINTMENT AS  WILL NOT BE IN THE OFFICE ON THE DAY OF THEIR SCHEDULED APPOINTMENT.

## 2025-01-13 DIAGNOSIS — E78.2 MIXED HYPERLIPIDEMIA: ICD-10-CM

## 2025-01-13 RX ORDER — ROSUVASTATIN CALCIUM 5 MG/1
5 TABLET, COATED ORAL NIGHTLY
Qty: 90 TABLET | Refills: 1 | Status: SHIPPED | OUTPATIENT
Start: 2025-01-13 | End: 2025-01-20 | Stop reason: SDUPTHER

## 2025-01-13 NOTE — TELEPHONE ENCOUNTER
REQUESTING 90 DAY SUPPLY    Rx Refill Note  Requested Prescriptions     Pending Prescriptions Disp Refills    rosuvastatin (CRESTOR) 5 MG tablet 90 tablet 1     Sig: Take 1 tablet by mouth Every Night.      Last office visit with prescribing clinician: 11/21/2024   Last telemedicine visit with prescribing clinician: Visit date not found   Next office visit with prescribing clinician: 1/27/2025                         Would you like a call back once the refill request has been completed: [] Yes [] No    If the office needs to give you a call back, can they leave a voicemail: [] Yes [] No    Phylicia Clark MA  01/13/25, 14:58 EST

## 2025-01-20 DIAGNOSIS — E78.2 MIXED HYPERLIPIDEMIA: ICD-10-CM

## 2025-01-20 RX ORDER — ROSUVASTATIN CALCIUM 5 MG/1
5 TABLET, COATED ORAL NIGHTLY
Qty: 90 TABLET | Refills: 0 | Status: SHIPPED | OUTPATIENT
Start: 2025-01-20

## 2025-01-20 NOTE — TELEPHONE ENCOUNTER
Caller: Benjamin Enciso    Relationship: Self    Best call back number: [phone]     Requested Prescriptions:   Requested Prescriptions     Pending Prescriptions Disp Refills    rosuvastatin (CRESTOR) 5 MG tablet 90 tablet 1     Sig: Take 1 tablet by mouth Every Night.        Pharmacy where request should be sent: [facility] [id number] [address] - [phone]  - [phone]     Last office visit with prescribing clinician: 11/21/2024   Last telemedicine visit with prescribing clinician: Visit date not found   Next office visit with prescribing clinician: 1/27/2025     Additional details provided by patient: PATIENT ONLY HAS 2 PILLS LEFT. PATIENT DOES HAVE A FOLLOW UP WITH YOU ON 1.27.25. IS THERE ANY WAY YOU CAN SEND HIM MEDICATION TO LAST PLEASE?    Does the patient have less than a 3 day supply:  [x] Yes  [] No    Would you like a call back once the refill request has been completed: [] Yes [] No    If the office needs to give you a call back, can they leave a voicemail: [] Yes [] No    Slade Castellon Rep   01/20/25 10:07 EST

## 2025-01-27 ENCOUNTER — OFFICE VISIT (OUTPATIENT)
Dept: FAMILY MEDICINE CLINIC | Facility: CLINIC | Age: 60
End: 2025-01-27
Payer: COMMERCIAL

## 2025-01-27 VITALS
WEIGHT: 204.2 LBS | HEART RATE: 52 BPM | SYSTOLIC BLOOD PRESSURE: 128 MMHG | HEIGHT: 72 IN | DIASTOLIC BLOOD PRESSURE: 78 MMHG | BODY MASS INDEX: 27.66 KG/M2 | OXYGEN SATURATION: 99 %

## 2025-01-27 DIAGNOSIS — E78.2 MIXED HYPERLIPIDEMIA: Primary | ICD-10-CM

## 2025-01-27 DIAGNOSIS — Z79.899 ENCOUNTER FOR LONG-TERM (CURRENT) USE OF MEDICATIONS: ICD-10-CM

## 2025-01-27 PROCEDURE — 99213 OFFICE O/P EST LOW 20 MIN: CPT | Performed by: FAMILY MEDICINE

## 2025-01-27 NOTE — PROGRESS NOTES
"Chief Complaint  Hyperlipidemia    Subjective        Benjamin Enciso presents to [facility]  History of Present Illness  Patient is here today to follow-up on hyperlipidemia.  On recent labs he was found to have an ASCVD risk of 10%.  He exercises regularly and tries to eat very healthy.  He is currently taking rosuvastatin 5 mg daily.  He denies any side effects.    Cholesterol follow-up  Onset was at an unknown time.   Pertinent negative symptoms include no abdominal pain, no anorexia, no joint pain, no change in stool, no chest pain, no chills, no congestion, no cough, no diaphoresis, no fatigue, no fever, no headaches, no joint swelling, no myalgias, no nausea, no neck pain, no numbness, no rash, no sore throat, no swollen glands, no dysuria, no vertigo, no visual change, no vomiting and no weakness.   Treatment and/or Medications comments include: Rosuvastatin   Hyperlipidemia  Pertinent negatives include no chest pain or myalgias.       Objective   Vital Signs:  /78   Pulse 52   Ht 182.9 cm (72\")   Wt 92.6 kg (204 lb 3.2 oz)   SpO2 99%   BMI 27.69 kg/m²   Estimated body mass index is 27.69 kg/m² as calculated from the following:    Height as of this encounter: 182.9 cm (72\").    Weight as of this encounter: 92.6 kg (204 lb 3.2 oz).      Physical Exam  Vitals and nursing note reviewed.   Constitutional:       Appearance: He is well-developed.   HENT:      Head: Normocephalic and atraumatic.   Eyes:      General: No scleral icterus.     Conjunctiva/sclera: Conjunctivae normal.   Cardiovascular:      Rate and Rhythm: Normal rate and regular rhythm.      Heart sounds: Normal heart sounds.   Pulmonary:      Effort: Pulmonary effort is normal.      Breath sounds: Normal breath sounds.   Musculoskeletal:         General: Normal range of motion.      Cervical back: Normal range of motion and neck supple.   Skin:     General: Skin is warm and dry.      Capillary Refill: " Capillary refill takes less than 2 seconds.      Findings: No rash.   Neurological:      Mental Status: He is alert and oriented to person, place, and time.   Psychiatric:         Mood and Affect: Mood normal.         Behavior: Behavior normal.         Thought Content: Thought content normal.         Judgment: Judgment normal.        Result Review :  The following data was reviewed by: Lashae Milan DO on 01/27/2025:  Common labs          11/18/2024    08:41   Common Labs   Total Cholesterol 240    Triglycerides 163    HDL Cholesterol 53    LDL Cholesterol  158    PSA 0.9                  Assessment and Plan   Diagnoses and all orders for this visit:    1. Mixed hyperlipidemia (Primary)  -     Lipid Panel    2. Encounter for long-term (current) use of medications  -     Comprehensive Metabolic Panel  -     Lipid Panel    Patient is here today to follow-up on chronic stable hyperlipidemia.  Surveillance labs were obtained today and any medication changes will be made based on lab results and will be called to the patient later this week.          Follow Up   Return in about 10 months (around 11/19/2025) for Annual physical, Fasting.  Patient was given instructions and counseling regarding his condition or for health maintenance advice. Please see specific information pulled into the AVS if appropriate.

## 2025-01-28 DIAGNOSIS — E78.2 MIXED HYPERLIPIDEMIA: ICD-10-CM

## 2025-01-28 LAB
ALBUMIN SERPL-MCNC: 4.5 G/DL (ref 3.5–5.2)
ALBUMIN/GLOB SERPL: 2 G/DL
ALP SERPL-CCNC: 80 U/L (ref 39–117)
ALT SERPL-CCNC: 28 U/L (ref 1–41)
AST SERPL-CCNC: 18 U/L (ref 1–40)
BILIRUB SERPL-MCNC: 0.2 MG/DL (ref 0–1.2)
BUN SERPL-MCNC: 15 MG/DL (ref 8–23)
BUN/CREAT SERPL: 16.5 (ref 7–25)
CALCIUM SERPL-MCNC: 9.8 MG/DL (ref 8.6–10.5)
CHLORIDE SERPL-SCNC: 104 MMOL/L (ref 98–107)
CHOLEST SERPL-MCNC: 162 MG/DL (ref 0–200)
CO2 SERPL-SCNC: 28.8 MMOL/L (ref 22–29)
CREAT SERPL-MCNC: 0.91 MG/DL (ref 0.76–1.27)
EGFRCR SERPLBLD CKD-EPI 2021: 95.9 ML/MIN/1.73
GLOBULIN SER CALC-MCNC: 2.3 GM/DL
GLUCOSE SERPL-MCNC: 97 MG/DL (ref 65–99)
HDLC SERPL-MCNC: 56 MG/DL (ref 40–60)
LDLC SERPL CALC-MCNC: 78 MG/DL (ref 0–100)
POTASSIUM SERPL-SCNC: 4.7 MMOL/L (ref 3.5–5.2)
PROT SERPL-MCNC: 6.8 G/DL (ref 6–8.5)
SODIUM SERPL-SCNC: 141 MMOL/L (ref 136–145)
TRIGL SERPL-MCNC: 166 MG/DL (ref 0–150)
VLDLC SERPL CALC-MCNC: 28 MG/DL (ref 5–40)

## 2025-01-28 RX ORDER — ROSUVASTATIN CALCIUM 5 MG/1
5 TABLET, COATED ORAL NIGHTLY
Qty: 90 TABLET | Refills: 3 | Status: SHIPPED | OUTPATIENT
Start: 2025-01-28

## 2025-07-22 DIAGNOSIS — E78.2 MIXED HYPERLIPIDEMIA: ICD-10-CM

## 2025-07-22 RX ORDER — ROSUVASTATIN CALCIUM 5 MG/1
5 TABLET, COATED ORAL NIGHTLY
Qty: 90 TABLET | Refills: 1 | Status: SHIPPED | OUTPATIENT
Start: 2025-07-22

## 2025-07-22 NOTE — TELEPHONE ENCOUNTER
Caller: Benjamin Enciso    Relationship: Self    Best call back number: [phone]     Requested Prescriptions:   Requested Prescriptions     Pending Prescriptions Disp Refills    rosuvastatin (CRESTOR) 5 MG tablet 90 tablet 3     Sig: Take 1 tablet by mouth Every Night.        Pharmacy where request should be sent: [facility] [id number] [address] [facility] [address] - [phone]  - [phone]     Last office visit with prescribing clinician: 1/27/2025   Last telemedicine visit with prescribing clinician: Visit date not found   Next office visit with prescribing clinician: 11/19/2025     Additional details provided by patient:      Does the patient have less than a 3 day supply:  [x] Yes  [] No    Would you like a call back once the refill request has been completed: [x] Yes [] No    If the office needs to give you a call back, can they leave a voicemail: [x] Yes [] No    Slade Gama Rep   07/22/25 08:27 EDT

## (undated) DEVICE — CANN NASL CO2 TRULINK W/O2 A/

## (undated) DEVICE — ENDOGATOR HYBRID TUBING KIT FOR USE WITH ENDOGATOR IRRIGATION PUMP, OLYMPUS PUMP, GI4000 ESU, AND TORRENT IRRIGATION PUMP.: Brand: ENDOGATOR KIT

## (undated) DEVICE — Device

## (undated) DEVICE — GOWN PROC ENDOARMOR GI LVL3 HY/SHLD UNIV

## (undated) DEVICE — GOWN ISOL W/THUMB UNIV BLU BX/15

## (undated) DEVICE — KT ORCA ORCAPOD DISP STRL

## (undated) DEVICE — FLEX ADVANTAGE 1500CC: Brand: FLEX ADVANTAGE